# Patient Record
Sex: FEMALE | Race: BLACK OR AFRICAN AMERICAN | URBAN - METROPOLITAN AREA
[De-identification: names, ages, dates, MRNs, and addresses within clinical notes are randomized per-mention and may not be internally consistent; named-entity substitution may affect disease eponyms.]

---

## 2020-04-15 ENCOUNTER — HISTORICAL (OUTPATIENT)
Dept: ADMINISTRATIVE | Facility: HOSPITAL | Age: 20
End: 2020-04-15

## 2020-04-15 LAB — SARS-COV-2 RNA RESP QL NAA+PROBE: NOT DETECTED

## 2020-04-16 ENCOUNTER — HISTORICAL (OUTPATIENT)
Dept: SURGERY | Facility: HOSPITAL | Age: 20
End: 2020-04-16

## 2020-04-22 ENCOUNTER — HISTORICAL (OUTPATIENT)
Dept: ADMINISTRATIVE | Facility: HOSPITAL | Age: 20
End: 2020-04-22

## 2020-04-29 ENCOUNTER — HISTORICAL (OUTPATIENT)
Dept: ADMINISTRATIVE | Facility: HOSPITAL | Age: 20
End: 2020-04-29

## 2020-05-27 ENCOUNTER — HISTORICAL (OUTPATIENT)
Dept: ADMINISTRATIVE | Facility: HOSPITAL | Age: 20
End: 2020-05-27

## 2020-07-08 ENCOUNTER — HISTORICAL (OUTPATIENT)
Dept: ADMINISTRATIVE | Facility: HOSPITAL | Age: 20
End: 2020-07-08

## 2020-10-12 ENCOUNTER — HISTORICAL (OUTPATIENT)
Dept: ADMINISTRATIVE | Facility: HOSPITAL | Age: 20
End: 2020-10-12

## 2022-04-10 ENCOUNTER — HISTORICAL (OUTPATIENT)
Dept: ADMINISTRATIVE | Facility: HOSPITAL | Age: 22
End: 2022-04-10

## 2022-04-29 VITALS — HEIGHT: 63 IN | BODY MASS INDEX: 22.66 KG/M2 | WEIGHT: 127.88 LBS

## 2022-05-04 NOTE — HISTORICAL [facility] C[facility]ER
This is a historical note converted from Michael. Formatting and pictures may have been removed.  Please reference Michael for original formatting and attached multimedia. Chief Complaint  f/u right orif/radial shaft  History of Present Illness  20 old female status post?right radial shaft ORIF, date of surgery 4/16/2020.  ?   Last seen 5/27 where we discontinued her immobilization and ordered her PT for stiffness and limited her WBS to no more than 20 lbs.  ?   She returns today?for follow up.? She says she is doing very well but says she didnt get the PT order and wasnt able to go to visits.? I am unclear as to how this would have happened as she was handed the PT script when leaving her last clinic visit.? She does however report that?shes been doing exercises and ROM on her own and feels like shes improving, but would still like formal therapy.? Has some pain in the arm and wrist with her ADLs but it is tolerable.? No falls, no fevers/chills.? Still has numbness on the back of her thumb.  Review of Systems  Constitutional:?no fever, fatigue, weakness  Eye:?no vision loss, eye redness, drainage, or pain  ENMT:?no sore throat, ear pain, sinus pain/congestion, nasal congestion/drainage  Respiratory:?no cough, no wheezing, no shortness of breath  Cardiovascular:?no chest pain, no palpitations, no edema  Gastrointestinal:?no nausea, vomiting, or diarrhea. No abdominal pain  Physical Exam  Vitals & Measurements  HT:?160?cm? WT:?58.2?kg? BMI:?22.73? LMP:?06/14/2020 00:00 CDT?  Right upper extremity:  Surgical incision at volar forearm is well healed  Able thumb up, A-OK, cross fingers with 5/5 strength  Full ROM of fingers and MCP joints, able to make full fist  Diminished sensation to dorsal radial?aspect of?thumb?base.  Wrist ROM:? Extension 30, flexion 40.  Sensation intact otherwise  WWP  Assessment/Plan  Arm pain, right?M79.601  20 old female status post?right radial shaft ORIF, date of surgery 4/16/2020, awaiting  PT.  - Placed new order for PT/OT and gave script to patient  - Discussed that x-ray demonstrates healed fracture  - No WB limit at this point  - Will have her return in 3 months to recheck her ROM  ?   Beena Penny?was evaluated at the time of the encounter with?Dr Malik.? HPI, PE and treatment plan was reviewed.? Treatment plan was reasonable and necessary.??Imaging was reviewed at the time of visit.??   Medications  ibuprofen 800 mg oral tablet, 800 mg= 1 tab(s), Oral, q8hr, 1 refills,? ?Not taking  Diagnostic Results  XR R forearm:? S/p ORIF, no hardware complications.??Healed fracture in excellent alignment.

## 2022-05-04 NOTE — HISTORICAL [facility] C[facility]
This is a historical note converted from Michael. Formatting and pictures may have been removed.  Please reference Michael for original formatting and attached multimedia. Chief Complaint  fu right wrist  History of Present Illness  20 old female status post?right radial shaft ORIF, date of surgery 4/16/2020.  Doing well.  Hand is stiff.  Has been in immobilization for 6 weeks.  Does complain of some dorsal radial?numbness?thumb?and some ulnar-sided wrist pain, likely secondary to postsurgical changes and immobilization  Review of Systems  Constitutional:?no fever, fatigue, weakness  Eye:?no vision loss, eye redness, drainage, or pain  ENMT:?no sore throat, ear pain, sinus pain/congestion, nasal congestion/drainage  Respiratory:?no cough, no wheezing, no shortness of breath  Cardiovascular:?no chest pain, no palpitations, no edema  Gastrointestinal:?no nausea, vomiting, or diarrhea. No abdominal pain  Physical Exam  Vitals & Measurements  HT:?160?cm? WT:?63?kg? BMI:?24.61?  Right upper extremity  Able thumb up, A-OK, cross fingers  Patient does have some stiffness in metacarpophalangeal?joints as well as?phalanx  Diminished in station to dorsal radial?aspect of?thumb?base.  Sensation intact otherwise  Wound healed  Assessment/Plan  Arm pain, right?M79.601  Status post ORIF?right radial shaft, date of surgery 4/16/2020.  Discontinue mobilization.  Referred to therapy due to some hand stiffness.  Return to clinic in 6 weeks for repeat range of motion x-ray.  Continue no lifting greater than 10 to 20 pounds with right upper extremity for 2 weeks.  ?  Beena Penny?was evaluated at the time of the encounter with?Dr Ogden.? HPI, PE and treatment plan was reviewed.? Treatment plan was reasonable and necessary.??Imaging was reviewed at the time of visit.??   Medications  acetaminophen-hydrocodone 325 mg-5 mg oral tablet, 1 tab(s), Oral, q6hr,? ?Not taking  Ancef, 2 gm= 50 mL, IV, On Call,? ?Not Taking, Completed  Rx  ibuprofen 800 mg oral tablet, 800 mg= 1 tab(s), Oral, q8hr, 1 refills,? ?Not taking  Diagnostic Results  X-ray right forearm, healing radial shaft fracture, hardware in place, no evidence of displacement

## 2022-05-04 NOTE — HISTORICAL [facility] C[facility]
This is a historical note converted from Michael. Formatting and pictures may have been removed.  Please reference Michael for original formatting and attached multimedia. Chief Complaint  Right arm pain  History of Present Illness  20 old female status post?right radial shaft ORIF, date of surgery 4/16/2020.  ?   Last seen 5/27 where we discontinued her immobilization and ordered her PT for stiffness and limited her WBS to no more than 20 lbs. Seen on 7/8 again, at that visit stated that she had not gone for formal PT because she did not receive the PT script. Unsure how, as it was handed to her on office visit d/c. Was given another script for PT and told to return to clinic in 3 months for evaluation of ROM. At the time she was complaining of numbness on the back of her thumb.  ?   Today (10/12):  ?   In regards to her right forearm, she is doing well. Denies any stiffness in her right forearm. Her only complaint regarding that extremity is thickening of a small part of the surgical scar.?  ?   Acute complaints:?left hand pain x2 months, points to the DIP of her 5th phalanx. States that she does not remember what happened exactly, but she may have hit her finger on something.?She has mild pain over her DIP rated as a 4/10. ?Also dropped a box on her right foot 2 days ago. Now with pain, swelling, and bruising at the base of her 5th toe. Unable to wear closed toe shoes, so has been staying home.  ?   Employment: [employer]  Review of Systems  Constitutional:no fever,no chills, no weight loss,no fatigue  HEENT:no sore throat,no nasal congestion,no ear ache,no cough  CV: no swelling, no edema,no palpitations  Resp:no SOB, wheezing  GI: no fecal incontinence,?no dysuria,?no hematuria,?no?abd pain  :? no urinary retention, no urinary incontinence  Skin:no rash, no wound  Neuro: no numbness/tingling, no weakness, no saddle anesthesia  MSK:See HPI  Psych: no depression, no anxiety  Heme/Lymph: no easy bruising, no easy  bleeding, no lymphadenopathy  Physical Exam  Vitals & Measurements  HT:?160.00?cm? WT:?58.000?kg? BMI:?22.66? LMP:?09/29/2020 00:00 CDT?  R Wrist:  Inspection:?no swelling, ?no erythema, ?no bruising,?no?thenar atrophy. She has a healed central surgical scar on the volar aspect of right forearm, with 1.5 cm of fibrosis of a portion of the healed scar, consistent with keloid.  Palpation:?No tenderness to palpationat distal radius  ROM:?Full ROM in all planes: flexion, extension, abduction, adduction  Strength:? Flexion ?5/5, Extension ?5/5, Abduction ?5/5, Adduction ?5/5, ?fairGrip Strength  Neurovascular:? 2+ radial pulse, sensation intact at Radial, Ulnar, Median Nerve distribution  ?   L. Hand  Inspection: Mild hypertrophy of DIP of 5th phalanx. No swelling, erythema, bruising, or athrophy  Palpation: Mild tenderness to palpation of DIP of 5th phalanx  ROM: Decreased ability for extend DIP.  Strength: 5/5 intact, with some mild discomfort 2/2 pain  Neurovascular:? 2+ radial pulse, sensation intact at Radial, Ulnar, Median Nerve distribution  ?  R Foot  Inspection:?mild edema, and bruising over base of 5th phalanx.  Current footware: slide in sandals  Arch: normal  Palpation: no TTP  ?   ROM:  Plantar Flexion (0-45): 45 degrees  Dorsiflexion (0-20): 20 degrees  Pronation?(0-20): 20 degrees  Supination?(0-30): 30 degrees  Strength: 5/5 in all planes  ?   General: well developed;?well nourished; cooperative  PSYCH: alert and oriented x 3?with?appropriate mood and affect  SKIN: inspection and palpation of skin and soft tissue normal; no scars noted on upper/lower extremities  CV: vascular integrity noted; +2 symmetrical pulses, no edema  NEURO: sensation intact by light touch; DTRs +2 bilateral and symmetrical  LYMPH: no LAD noted  ?  Assessment/Plan  1.?S/P ORIF (open reduction internal fixation) fracture?Z98.890  -ROM of right arm improved, concerning area at central incision with 1.5 cm keloid.  -Diagnosis and  treatment options discussed with patient  -RTC PRN, call if any issues  ?  2.?Mallet finger of left hand?M20.012  -Imaging ordered and reviewed independently; no acute fractures seen. Hypertrophy of DIP join; final radiology read pending.  -Stabilization/Immobilization:?None?  -Activity:?Activity as tolerated  -Therapy:?formal PT/OT?script given to pt, HEP handout also given and discussed with pt to increase ROM  -Medication:?OTC NSAIDs or Tylenol prn  -RTC:?PRN; call if any issues  -Additional work-up:?none  ?  3.?Foot pain, right?M79.671  -Imaging ordered and reviewed independently; no acute fractures seen. Mild tissue swelling noted. Final radiology read pending.  -Stabilization/Immobilization:?None?; but patient given short boot for comfort  -Activity:?Activity as tolerated  -Therapy: R.I.C.E when at home, conservative management  -Medication:?OTC NSAIDs or Tylenol prn  -RTC:?PRN; call if any issues  -Additional work-up:?none  ?  Beena Penny?was evaluated at the time of the encounter with?Dr Zepeda.? HPI, PE and treatment plan was reviewed.? Treatment plan was reasonable and necessary.??Imaging was reviewed at the time of visit.??  ?  Ordered:  ?   Diagnostic Results  X-ray Left hand: Imaging ordered and reviewed independently; no acute fractures seen. Hypertrophy of DIP join; final radiology read pending.  ?  X-ray Right foot: Imaging ordered and reviewed independently; no acute fractures seen. Mild tissue swelling noted. Final radiology read pending.
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
